# Patient Record
Sex: FEMALE | Race: WHITE | NOT HISPANIC OR LATINO | URBAN - NONMETROPOLITAN AREA
[De-identification: names, ages, dates, MRNs, and addresses within clinical notes are randomized per-mention and may not be internally consistent; named-entity substitution may affect disease eponyms.]

---

## 2017-03-14 ENCOUNTER — TELEPHONE (OUTPATIENT)
Dept: OBSTETRICS AND GYNECOLOGY | Facility: CLINIC | Age: 22
End: 2017-03-14

## 2017-03-14 RX ORDER — NORGESTIMATE AND ETHINYL ESTRADIOL 0.25-0.035
1 KIT ORAL DAILY
Qty: 90 TABLET | Refills: 0 | Status: SHIPPED | OUTPATIENT
Start: 2017-03-14 | End: 2017-05-19 | Stop reason: SDUPTHER

## 2017-03-14 NOTE — TELEPHONE ENCOUNTER
----- Message from Saida Vaughn sent at 3/13/2017  2:48 PM EDT -----  Contact: PT'S MOTHER  THIS IS ALYSON'S PT.  SHE'S NOT DUE FOR ANNUAL UNTIL AUGUST.  CAN WE SEND [facility] REFILLS TO [facility] FOR 90-DAY SUPPLY?  THANKS

## 2017-08-11 NOTE — TELEPHONE ENCOUNTER
Patient may have one refill of 90 days but please inform her she needs to make appt for annual-thanks

## 2017-10-30 RX ORDER — NORGESTIMATE AND ETHINYL ESTRADIOL 0.25-0.035
KIT ORAL
Qty: 84 TABLET | Refills: 0 | Status: SHIPPED | OUTPATIENT
Start: 2017-10-30

## 2017-10-30 NOTE — TELEPHONE ENCOUNTER
May have one refill only-please inform patient she will receive no further refills until she has been seen-thanks

## 2018-01-24 RX ORDER — NORGESTIMATE AND ETHINYL ESTRADIOL 0.25-0.035
KIT ORAL
Qty: 84 TABLET | Refills: 0 | OUTPATIENT
Start: 2018-01-24